# Patient Record
Sex: FEMALE | Race: BLACK OR AFRICAN AMERICAN | NOT HISPANIC OR LATINO | Employment: UNEMPLOYED | URBAN - METROPOLITAN AREA
[De-identification: names, ages, dates, MRNs, and addresses within clinical notes are randomized per-mention and may not be internally consistent; named-entity substitution may affect disease eponyms.]

---

## 2023-09-28 PROBLEM — I77.71 DISSECTION OF LEFT CAROTID ARTERY (MULTI): Status: ACTIVE | Noted: 2023-09-28

## 2023-09-28 PROBLEM — M67.88 ACHILLES TENDINOSIS: Status: ACTIVE | Noted: 2023-09-28

## 2023-09-28 PROBLEM — I63.9 CEREBROVASCULAR ACCIDENT (CVA) (MULTI): Status: ACTIVE | Noted: 2023-09-28

## 2023-09-28 PROBLEM — I10 HYPERTENSION: Status: ACTIVE | Noted: 2023-09-28

## 2023-09-28 PROBLEM — Z86.73 HISTORY OF ISCHEMIC LEFT MCA STROKE: Status: ACTIVE | Noted: 2023-09-28

## 2023-09-28 PROBLEM — I77.3 FIBROMUSCULAR DYSPLASIA OF CAROTID ARTERY (CMS-HCC): Status: ACTIVE | Noted: 2023-09-28

## 2023-09-28 PROBLEM — M23.90 INTERNAL DERANGEMENT OF KNEE: Status: ACTIVE | Noted: 2023-09-28

## 2023-09-28 PROBLEM — G56.03 BILATERAL CARPAL TUNNEL SYNDROME: Status: ACTIVE | Noted: 2023-09-28

## 2023-09-28 PROBLEM — G89.29 CHRONIC BACK PAIN: Status: ACTIVE | Noted: 2023-09-28

## 2023-09-28 PROBLEM — I69.359 HEMIPARESIS AFFECTING DOMINANT SIDE AS LATE EFFECT OF STROKE (MULTI): Status: ACTIVE | Noted: 2023-09-28

## 2023-09-28 PROBLEM — M54.9 CHRONIC BACK PAIN: Status: ACTIVE | Noted: 2023-09-28

## 2023-09-28 PROBLEM — R47.01 EXPRESSIVE APHASIA: Status: ACTIVE | Noted: 2023-09-28

## 2023-09-28 PROBLEM — I69.390: Status: ACTIVE | Noted: 2023-09-28

## 2023-09-28 PROBLEM — I69.320 APHASIA FOLLOWING CEREBRAL INFARCTION: Status: ACTIVE | Noted: 2023-09-28

## 2023-09-28 PROBLEM — I69.351 SPASTIC HEMIPLEGIA OF RIGHT DOMINANT SIDE AS LATE EFFECT OF CEREBRAL INFARCTION (MULTI): Status: ACTIVE | Noted: 2023-09-28

## 2023-09-28 PROBLEM — R26.9 NEUROLOGIC GAIT DISORDER: Status: ACTIVE | Noted: 2023-09-28

## 2023-09-28 PROBLEM — M35.9 CONNECTIVE TISSUE DISEASE, UNDIFFERENTIATED (MULTI): Status: ACTIVE | Noted: 2023-09-28

## 2023-09-28 PROBLEM — R00.2 PALPITATIONS: Status: ACTIVE | Noted: 2023-09-28

## 2023-09-28 PROBLEM — R56.9 SEIZURES (MULTI): Status: ACTIVE | Noted: 2023-09-28

## 2023-09-28 PROBLEM — M76.60 ACHILLES TENDINITIS: Status: ACTIVE | Noted: 2023-09-28

## 2023-09-28 PROBLEM — M92.60 HAGLUND'S DEFORMITY: Status: ACTIVE | Noted: 2023-09-28

## 2023-09-28 RX ORDER — DESVENLAFAXINE SUCCINATE 25 MG/1
TABLET, EXTENDED RELEASE ORAL
COMMUNITY
Start: 2021-01-14

## 2023-09-28 RX ORDER — OMEPRAZOLE 20 MG/1
1 TABLET, DELAYED RELEASE ORAL DAILY
COMMUNITY

## 2023-09-28 RX ORDER — LORATADINE 10 MG/1
TABLET ORAL
COMMUNITY

## 2023-09-28 RX ORDER — BACLOFEN 10 MG/1
TABLET ORAL
COMMUNITY
Start: 2021-07-26 | End: 2023-10-05 | Stop reason: SDUPTHER

## 2023-09-28 RX ORDER — FOLIC ACID 1 MG/1
TABLET ORAL
COMMUNITY
Start: 2020-09-24

## 2023-09-28 RX ORDER — TIZANIDINE 4 MG/1
TABLET ORAL
COMMUNITY
Start: 2021-08-05

## 2023-09-28 RX ORDER — HYDROXYCHLOROQUINE SULFATE 200 MG/1
TABLET, FILM COATED ORAL 2 TIMES DAILY
COMMUNITY
Start: 2021-06-09

## 2023-09-28 RX ORDER — ONDANSETRON 4 MG/1
TABLET, ORALLY DISINTEGRATING ORAL EVERY 6 HOURS
COMMUNITY
Start: 2023-02-04

## 2023-09-28 RX ORDER — AMLODIPINE BESYLATE 10 MG/1
1 TABLET ORAL DAILY
COMMUNITY
Start: 2017-09-05

## 2023-09-28 RX ORDER — VENLAFAXINE 50 MG/1
1 TABLET ORAL DAILY
COMMUNITY

## 2023-09-28 RX ORDER — GABAPENTIN 300 MG/1
1 CAPSULE ORAL 3 TIMES DAILY
COMMUNITY

## 2023-09-28 RX ORDER — LEVETIRACETAM 1000 MG/1
1 TABLET ORAL 2 TIMES DAILY
COMMUNITY
Start: 2022-09-05

## 2023-09-28 RX ORDER — ATORVASTATIN CALCIUM 40 MG/1
1 TABLET, FILM COATED ORAL NIGHTLY
COMMUNITY
Start: 2021-06-06

## 2023-09-28 RX ORDER — ONDANSETRON 4 MG/1
TABLET, FILM COATED ORAL EVERY 8 HOURS PRN
COMMUNITY

## 2023-09-28 RX ORDER — ALPRAZOLAM 1 MG/1
TABLET ORAL
COMMUNITY

## 2023-09-28 RX ORDER — RISPERIDONE 1 MG/1
TABLET ORAL
COMMUNITY

## 2023-09-28 RX ORDER — CLOPIDOGREL BISULFATE 75 MG/1
1 TABLET ORAL DAILY
COMMUNITY
Start: 2021-06-09

## 2023-09-28 RX ORDER — PANTOPRAZOLE SODIUM 20 MG/1
TABLET, DELAYED RELEASE ORAL
COMMUNITY
Start: 2021-08-05

## 2023-09-28 RX ORDER — FERROUS SULFATE 325(65) MG
TABLET ORAL 2 TIMES DAILY
COMMUNITY

## 2023-09-28 RX ORDER — LORATADINE 10 MG/1
CAPSULE, LIQUID FILLED ORAL
COMMUNITY
Start: 2021-08-05

## 2023-09-28 RX ORDER — ASPIRIN 81 MG/1
1 TABLET ORAL DAILY
COMMUNITY
Start: 2021-07-26

## 2023-09-28 RX ORDER — ACETAMINOPHEN 500 MG
TABLET ORAL
COMMUNITY
Start: 2020-10-27

## 2023-10-05 ENCOUNTER — OFFICE VISIT (OUTPATIENT)
Dept: NEUROLOGY | Facility: HOSPITAL | Age: 48
End: 2023-10-05
Payer: MEDICARE

## 2023-10-05 VITALS
SYSTOLIC BLOOD PRESSURE: 97 MMHG | HEIGHT: 62 IN | BODY MASS INDEX: 25.03 KG/M2 | DIASTOLIC BLOOD PRESSURE: 65 MMHG | HEART RATE: 74 BPM | WEIGHT: 136 LBS

## 2023-10-05 DIAGNOSIS — M62.838 MUSCLE SPASM: Primary | ICD-10-CM

## 2023-10-05 PROCEDURE — 99215 OFFICE O/P EST HI 40 MIN: CPT | Performed by: PSYCHIATRY & NEUROLOGY

## 2023-10-05 PROCEDURE — 3078F DIAST BP <80 MM HG: CPT | Performed by: PSYCHIATRY & NEUROLOGY

## 2023-10-05 PROCEDURE — 99205 OFFICE O/P NEW HI 60 MIN: CPT | Performed by: PSYCHIATRY & NEUROLOGY

## 2023-10-05 PROCEDURE — 3074F SYST BP LT 130 MM HG: CPT | Performed by: PSYCHIATRY & NEUROLOGY

## 2023-10-05 RX ORDER — BACLOFEN 10 MG/1
10 TABLET ORAL 3 TIMES DAILY
Qty: 90 TABLET | Refills: 3 | Status: SHIPPED | OUTPATIENT
Start: 2023-10-05 | End: 2024-02-27 | Stop reason: SDUPTHER

## 2023-10-05 RX ORDER — LOSARTAN POTASSIUM 50 MG/1
100 TABLET ORAL DAILY
COMMUNITY

## 2023-10-05 NOTE — PROGRESS NOTES
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 Knutson for botox evaluation.      Cause of spasticity: CVA   Cerebral spasticity: yes  Spinal spasticity: no  Mobility: four-point cane  Transfers: on own  Falls: no  AFO: yes     Spasticity interferes with residual function: yes, walking is affected by spasticity. No residual function in right hand  Spasticity interferes with ROM and posture: yes fingers and elbow.   Spasticity causes pain and/or discomfort: some painful cramps in the arm and leg.   Spasticity interferes with hygiene or skin care: no  UTIs and last urine check: no  temperature effect: yes cold makes her more stiff.      Tonic spasms:   Flexor:no  Extensor: no  Adductor: no  Isometric (cramps): yes arm and leg near daily, painful, no triggers.   Complex (more than 1 at a time): no  Painful: yes  Focal dystonia: right arm and finger dystonic flexion   Myoclonus: no  Spontaneous clonus: shaky right leg while showering.   Tremor: no  RLS: no     Treatment:  Baclofen: yes 10 mg once daily, no side effects. Never tried a higher dose.   Tizanidine: took in the past and was stopped by her doctors. No side effects.   Dantrolene: no  Gabapentin: took in the past  pregabalin: no  Tegretol: no  Trileptal: no  Anticholinergics: no  Dopamine agonist: no  Botulinum toxin: here to discuss.   Baclofen pump: no  Other treatments: none     PT: yes, helpful  OT: yes, helpful  Stretching: yes  Exercise: yes     Goals of spasticity management:   Function: walk better and get more movement in the arm   ROM/posture: yes to improve dystonic arm/finger flexion on the right.   Comfort: yes improve painful cramps in the arm and leg  Hygiene and skin care: no goals.         Spasticity exam:  Modified Taryn Scale Testing Form      Muscle Tested Score  right shoulder 2  right elbow 3  right wrist 4  right  fingers 03     left shoulder 0  left elbow 0  left wrist 0  left fingers 0     right hip 2  right knee 3  right ankle AFO     left hip 0  left knee 2 down from 0  left ankle 2 down from 0  ----------------------------     [place] Spasm Frequency Scale:  Score = 1     ----------------------------  Extensor spasms: no  Flexor spasms no  Adductor spasms: no  Isometric spasms: no  Complex spasms: no  Abnormal fixed posture or fixed dystonia: dystonic flexion of the right elbow, wrist, and fingers  Paroxysmal focal dystonia: no  Inducible clonus: no  Spontaneous clonus: not on exam  Myoclonus: no  Tremor: no  RLS/akathisia: no         Objective   Neurological Exam  Mental Status  Awake, alert and oriented to person, place and time. Expressive aphasia present. Attention and concentration are normal.    Cranial Nerves  CN II: Visual fields full to confrontation.  CN III, IV, VI: Extraocular movements intact bilaterally.  CN V: Facial sensation is normal.  CN VII: Full and symmetric facial movement.  CN VIII: Hearing is normal.  CN IX, X: Palate elevates symmetrically  CN XI:  Right: Sternocleidomastoid strength is weak. Trapezius strength is weak.  CN XII: Tongue midline without atrophy or fasciculations.    Motor   Increased muscle tone. Right hemiparesis.  Distal strength zero in RUE  RLE 4-/5.    Gait  Casual gait: Spastic gait.  Physical Exam  Eyes:      Extraocular Movements: Extraocular movements intact.       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 Main concern is improving ROM of the RUE and improve walking. She reports painful isometric spasms on the right side at nighttime. Exam positive for severe spasticity of the right hemibody with dystonic flexion of the right elbow, wrist, and fingers along with some extension of the right knee. No tonic spasms, clonus,  myoclonus, or tremor on exam. She has had no response to oral baclofen so far. She can be a good candidate for a higher dose of baclofen and targeted botulinum toxin injection to the following muscles on the right: biceps 50 U, FDS 25 U, FDP 25 U, FCU 25 U, FCR 25 U, FPB 25 U, MED GC 25, LAT CG 25, TP 25. The doses can be increased over time as tolerated. ITB not indicated due to maximum goals in the UE and lack of injectable trial.     PLAN:  I think you will be a good candidate for botox to improve the range of motion and posture of your right arm and ankle. Please keep in mind that botox does not make you stronger.     I will start the process to authorize botox for you. First step is to call Jory at [phone] #[phone] to schedule your injections directly. We are booking out a couple month in advance and she will do the prior authorization a month in advance and have the toxin ordered for that date.     In the meantime, please increase baclofen to 10 mg twice daily for two weeks then increase to one tablet three times daily after that.     The impression and plan were discussed with the patient and family (if present) in details and all questions answered.      Will share the note with the referring physician via EMR    Rex Burkett MD

## 2023-10-05 NOTE — PATIENT INSTRUCTIONS
I think you will be a good candidate for botox to improve the range of motion and posture of your right arm and ankle. Please keep in mind that botox does not make you stronger.     I will start the process to authorize botox for you. First step is to call Jory at [phone] #[phone] to schedule your injections directly. We are booking out a couple month in advance and she will do the prior authorization a month in advance and have the toxin ordered for that date.     In the meantime, please increase baclofen to 10 mg twice daily for two weeks then increase to one tablet three times daily after that.     Please continue daily stretching along with physical and occupational therapy.     Thank you visiting the clinic today    Rex Burkett MD

## 2023-10-17 ENCOUNTER — APPOINTMENT (OUTPATIENT)
Dept: CARDIOLOGY | Facility: HOSPITAL | Age: 48
End: 2023-10-17
Payer: COMMERCIAL

## 2023-10-26 ENCOUNTER — APPOINTMENT (OUTPATIENT)
Dept: RADIOLOGY | Facility: HOSPITAL | Age: 48
End: 2023-10-26
Payer: MEDICAID

## 2023-10-26 ENCOUNTER — HOSPITAL ENCOUNTER (OUTPATIENT)
Dept: RADIOLOGY | Facility: HOSPITAL | Age: 48
End: 2023-10-26
Payer: MEDICAID

## 2023-10-30 ENCOUNTER — APPOINTMENT (OUTPATIENT)
Dept: PHYSICAL THERAPY | Facility: CLINIC | Age: 48
End: 2023-10-30
Payer: MEDICAID

## 2023-10-30 ENCOUNTER — APPOINTMENT (OUTPATIENT)
Dept: SPEECH THERAPY | Facility: CLINIC | Age: 48
End: 2023-10-30
Payer: MEDICAID

## 2023-10-31 DIAGNOSIS — I15.8 OTHER SECONDARY HYPERTENSION: ICD-10-CM

## 2023-11-02 ENCOUNTER — LAB (OUTPATIENT)
Dept: LAB | Facility: LAB | Age: 48
End: 2023-11-02
Payer: MEDICAID

## 2023-11-02 DIAGNOSIS — I15.8 OTHER SECONDARY HYPERTENSION: ICD-10-CM

## 2023-11-02 PROCEDURE — 82565 ASSAY OF CREATININE: CPT

## 2023-11-02 PROCEDURE — 36415 COLL VENOUS BLD VENIPUNCTURE: CPT

## 2023-11-02 PROCEDURE — 84520 ASSAY OF UREA NITROGEN: CPT

## 2023-11-07 LAB
BUN SERPL-MCNC: 13 MG/DL (ref 6–23)
CREAT SERPL-MCNC: 0.7 MG/DL (ref 0.5–1.05)
GFR SERPL CREATININE-BSD FRML MDRD: >90 ML/MIN/1.73M*2

## 2023-11-16 ENCOUNTER — PROCEDURE VISIT (OUTPATIENT)
Dept: NEUROLOGY | Facility: CLINIC | Age: 48
End: 2023-11-16
Payer: MEDICARE

## 2023-11-16 VITALS — WEIGHT: 136 LBS | BODY MASS INDEX: 24.87 KG/M2

## 2023-11-16 DIAGNOSIS — I69.351 SPASTIC HEMIPLEGIA OF RIGHT DOMINANT SIDE AS LATE EFFECT OF CEREBRAL INFARCTION (MULTI): Primary | ICD-10-CM

## 2023-11-16 DIAGNOSIS — M62.838 MUSCLE SPASM: ICD-10-CM

## 2023-11-16 PROCEDURE — 64642 CHEMODENERV 1 EXTREMITY 1-4: CPT | Performed by: PSYCHIATRY & NEUROLOGY

## 2023-11-16 PROCEDURE — 95874 GUIDE NERV DESTR NEEDLE EMG: CPT | Performed by: PSYCHIATRY & NEUROLOGY

## 2023-11-16 PROCEDURE — 64643 CHEMODENERV 1 EXTREM 1-4 EA: CPT | Performed by: PSYCHIATRY & NEUROLOGY

## 2023-11-16 PROCEDURE — 99214 OFFICE O/P EST MOD 30 MIN: CPT | Performed by: PSYCHIATRY & NEUROLOGY

## 2023-11-16 NOTE — PROGRESS NOTES
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     Interval hx:  The increased dose of baclofen helped without side effects. Here for first botox injections. Had pain with injections.      Cause of spasticity: CVA   Cerebral spasticity: yes  Spinal spasticity: no  Mobility: four-point cane  Transfers: on own  Falls: no  AFO: yes     Spasticity interferes with residual function: yes, walking is affected by spasticity. No residual function in right hand  Spasticity interferes with ROM and posture: yes fingers and elbow.   Spasticity causes pain and/or discomfort: some painful cramps in the arm and leg.   Spasticity interferes with hygiene or skin care: no  UTIs and last urine check: no  temperature effect: yes cold makes her more stiff.      Tonic spasms:   Flexor:no  Extensor: no  Adductor: no  Isometric (cramps): yes arm and leg near daily, painful, no triggers.   Complex (more than 1 at a time): no  Painful: yes  Focal dystonia: right arm and finger dystonic flexion   Myoclonus: no  Spontaneous clonus: shaky right leg while showering.   Tremor: no  RLS: no     Treatment:  Baclofen: yes 10 mg TID helpful   Tizanidine: took in the past and was stopped by her doctors. No side effects.   Dantrolene: no  Gabapentin: took in the past  pregabalin: no  Tegretol: no  Trileptal: no  Anticholinergics: no  Dopamine agonist: no  Botulinum toxin: first injection 11/16/2023  Baclofen pump: no  Other treatments: none     PT: yes, helpful  OT: yes, helpful  Stretching: yes  Exercise: yes     Goals of spasticity management:   Function: walk better and get more movement in the arm   ROM/posture: yes to improve dystonic arm/finger flexion on the right.   Comfort: yes improve painful cramps in the arm and leg  Hygiene and skin care: no goals.         Spasticity exam:  Modified Taryn Scale Testing  Form      Muscle Tested Score  right shoulder 2  right elbow 3  right wrist 4  right fingers 03     left shoulder 0  left elbow 0  left wrist 0  left fingers 0     right hip 2  right knee 3  right ankle AFO     left hip 0  left knee 2 down from 0  left ankle 2 down from 0  ----------------------------     [place] Spasm Frequency Scale:  Score = 1     ----------------------------  Extensor spasms: no  Flexor spasms no  Adductor spasms: no  Isometric spasms: no  Complex spasms: no  Abnormal fixed posture or fixed dystonia: dystonic flexion of the right elbow, wrist, and fingers  Paroxysmal focal dystonia: no  Inducible clonus: no  Spontaneous clonus: not on exam  Myoclonus: no  Tremor: no  RLS/akathisia: no         Objective   Neurological Exam  Mental Status  Awake, alert and oriented to person, place and time. Expressive aphasia present. Attention and concentration are normal.    Cranial Nerves  CN II: Visual fields full to confrontation.  CN III, IV, VI: Extraocular movements intact bilaterally.  CN V: Facial sensation is normal.  CN VII: Full and symmetric facial movement.  CN VIII: Hearing is normal.  CN IX, X: Palate elevates symmetrically  CN XI:  Right: Sternocleidomastoid strength is weak. Trapezius strength is weak.  CN XII: Tongue midline without atrophy or fasciculations.    Motor   Increased muscle tone. Right hemiparesis.  Distal strength zero in RUE  RLE 4-/5.    Gait  Casual gait: Spastic gait.    Physical Exam  Eyes:      Extraocular Movements: Extraocular movements intact.        Procedure note:    Patient was injected with botox to the following muscles on the right:  biceps 50 U, FDS 25 U, FDP 25 U, FCU 25 U, FCR 25 U, FPB 25 U, MED GC 25, LAT CG 25, TP 25.   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 Main concern is improving ROM of the RUE and improve walking. She reports painful isometric spasms on the right side at nighttime. Exam positive for severe spasticity of the right hemibody with dystonic flexion of the right elbow, wrist, and fingers along with some extension of the right knee. No tonic spasms, clonus, myoclonus, or tremor on exam. She has had no response to oral baclofen so far. She can be a good candidate for a higher dose of baclofen and targeted botulinum toxin injection to the following muscles on the right: biceps 50 U, FDS 25 U, FDP 25 U, FCU 25 U, FCR 25 U, FPB 25 U, MED GC 25, LAT CG 25, TP 25. The doses can be increased over time as tolerated. ITB not indicated due to maximum goals in the UE and lack of injectable trial.     11/16/2023: The increased dose of baclofen helped without side effects. Here for first botox injections. Had pain with injections.     PLAN:  You were injected with a total of 250 units of botox. You tolerated the procedure well. The effect of botox usually kicks in after 3 to 5 days and lasts for three months. Please call office after about a week to report how you are doing. Follow up after three months for repeat injections.     Please continue the same dose of baclofen.     Please continue physical and occupational therapy.     Please continue daily stretching.     Thank you visiting the clinic today      Rex Burkett MD      The impression and plan were discussed with the patient and family (if present) in details and all questions answered.      Will share the note with the referring physician via EMR    Rex Burkett MD

## 2023-11-16 NOTE — PATIENT INSTRUCTIONS
You were injected with a total of 250 units of botox. You tolerated the procedure well. The effect of botox usually kicks in after 3 to 5 days and lasts for three months. Please call office after about a week to report how you are doing. Follow up after three months for repeat injections.     Please continue the same dose of baclofen.     Please continue physical and occupational therapy.     Please continue daily stretching.     Thank you visiting the clinic today      Rex Burkett MD

## 2023-11-24 ENCOUNTER — HOSPITAL ENCOUNTER (OUTPATIENT)
Dept: RADIOLOGY | Facility: HOSPITAL | Age: 48
Discharge: HOME | End: 2023-11-24
Payer: MEDICARE

## 2023-11-24 DIAGNOSIS — I77.71 DISSECTION OF CAROTID ARTERY (MULTI): ICD-10-CM

## 2023-11-24 DIAGNOSIS — I77.3 ARTERIAL FIBROMUSCULAR DYSPLASIA (CMS-HCC): ICD-10-CM

## 2023-11-24 DIAGNOSIS — M35.9 SYSTEMIC INVOLVEMENT OF CONNECTIVE TISSUE, UNSPECIFIED (MULTI): ICD-10-CM

## 2023-11-24 PROCEDURE — 74174 CTA ABD&PLVS W/CONTRAST: CPT

## 2023-11-24 PROCEDURE — 70498 CT ANGIOGRAPHY NECK: CPT | Performed by: RADIOLOGY

## 2023-11-24 PROCEDURE — 71275 CT ANGIOGRAPHY CHEST: CPT | Performed by: RADIOLOGY

## 2023-11-24 PROCEDURE — 70498 CT ANGIOGRAPHY NECK: CPT

## 2023-11-24 PROCEDURE — 74174 CTA ABD&PLVS W/CONTRAST: CPT | Performed by: RADIOLOGY

## 2023-11-24 PROCEDURE — 70496 CT ANGIOGRAPHY HEAD: CPT | Performed by: RADIOLOGY

## 2023-11-24 PROCEDURE — 2550000001 HC RX 255 CONTRASTS: Mod: SE | Performed by: INTERNAL MEDICINE

## 2023-11-24 RX ADMIN — IOHEXOL 100 ML: 350 INJECTION, SOLUTION INTRAVENOUS at 12:27

## 2023-12-14 ENCOUNTER — PATIENT MESSAGE (OUTPATIENT)
Dept: CARDIOLOGY | Facility: HOSPITAL | Age: 48
End: 2023-12-14

## 2024-02-27 ENCOUNTER — TELEPHONE (OUTPATIENT)
Dept: NEUROLOGY | Facility: HOSPITAL | Age: 49
End: 2024-02-27
Payer: MEDICARE

## 2024-02-27 DIAGNOSIS — M62.838 MUSCLE SPASM: Primary | ICD-10-CM

## 2024-02-27 RX ORDER — BACLOFEN 10 MG/1
10 TABLET ORAL 3 TIMES DAILY
Qty: 90 TABLET | Refills: 3 | Status: SHIPPED | OUTPATIENT
Start: 2024-02-27 | End: 2024-03-01 | Stop reason: SDUPTHER

## 2024-02-27 NOTE — TELEPHONE ENCOUNTER
Ann Marie Y Johnson called. She needs a refill of Baclofen 10 mg 1 tab TID. #90 with refills sent to [facility] on file.

## 2024-03-01 DIAGNOSIS — M62.838 MUSCLE SPASM: ICD-10-CM

## 2024-03-01 RX ORDER — BACLOFEN 10 MG/1
10 TABLET ORAL 3 TIMES DAILY
Qty: 90 TABLET | Refills: 3 | Status: SHIPPED | OUTPATIENT
Start: 2024-03-01 | End: 2024-04-11

## 2024-04-11 ENCOUNTER — PROCEDURE VISIT (OUTPATIENT)
Dept: NEUROLOGY | Facility: CLINIC | Age: 49
End: 2024-04-11
Payer: MEDICARE

## 2024-04-11 VITALS — WEIGHT: 143 LBS | BODY MASS INDEX: 26.16 KG/M2

## 2024-04-11 DIAGNOSIS — R25.2 SPASTICITY: Primary | ICD-10-CM

## 2024-04-11 DIAGNOSIS — M62.838 MUSCLE SPASM: ICD-10-CM

## 2024-04-11 PROCEDURE — 95874 GUIDE NERV DESTR NEEDLE EMG: CPT | Performed by: PSYCHIATRY & NEUROLOGY

## 2024-04-11 PROCEDURE — 64642 CHEMODENERV 1 EXTREMITY 1-4: CPT | Performed by: PSYCHIATRY & NEUROLOGY

## 2024-04-11 PROCEDURE — 64643 CHEMODENERV 1 EXTREM 1-4 EA: CPT | Performed by: PSYCHIATRY & NEUROLOGY

## 2024-04-11 PROCEDURE — 99214 OFFICE O/P EST MOD 30 MIN: CPT | Performed by: PSYCHIATRY & NEUROLOGY

## 2024-04-11 RX ORDER — BACLOFEN 20 MG/1
TABLET ORAL
Qty: 270 TABLET | Refills: 3 | Status: SHIPPED | OUTPATIENT
Start: 2024-04-11

## 2024-04-11 NOTE — PROGRESS NOTES
Neurology Botulinum Injection    Subjective   Ann Marie Bradshaw is an 48 y.o. female here for medical botulinum injection for Spasticity [R25.2]. ***    Objective   Neurological Exam  Physical Exam  Procedures  Assessment/Plan   ***  {Assess/PlanSmartLinks:91157}

## 2024-04-11 NOTE — PROGRESS NOTES
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      Interval hx:Botox helped but lasted only one month  Cause of spasticity: CVA   Cerebral spasticity: yes  Spinal spasticity: no  Mobility: four-point cane  Transfers: on own  Falls: no  AFO: yes     Spasticity interferes with residual function: yes, walking is affected by spasticity. No residual function in right hand  Spasticity interferes with ROM and posture: yes fingers and elbow.   Spasticity causes pain and/or discomfort: some painful cramps in the arm and leg.   Spasticity interferes with hygiene or skin care: no  UTIs and last urine check: no  temperature effect: yes cold makes her more stiff.      Tonic spasms:   Flexor:no  Extensor: no  Adductor: no  Isometric (cramps): yes arm and leg near daily, painful, no triggers.   Complex (more than 1 at a time): no  Painful: yes  Focal dystonia: right arm and finger dystonic flexion   Myoclonus: no  Spontaneous clonus: shaky right leg while showering.   Tremor: no  RLS: no     Treatment:  Baclofen: yes 10 mg TID helpful   Tizanidine: took in the past and was stopped by her doctors. No side effects.   Dantrolene: no  Gabapentin: took in the past  pregabalin: no  Tegretol: no  Trileptal: no  Anticholinergics: no  Dopamine agonist: no  Botulinum toxin: first injection 11/16/2023  Baclofen pump: no  Other treatments: none     PT: yes, helpful  OT: yes, helpful  Stretching: yes  Exercise: yes     Goals of spasticity management:   Function: walk better and get more movement in the arm   ROM/posture: yes to improve dystonic arm/finger flexion on the right.   Comfort: yes improve painful cramps in the arm and leg  Hygiene and skin care: no goals.            Spasticity exam:  Modified Taryn Scale Testing Form      Muscle Tested Score  right shoulder 2  right elbow 3  right wrist  4  right fingers 03     left shoulder 0  left elbow 0  left wrist 0  left fingers 0     right hip 2  right knee 3  right ankle AFO     left hip 0  left knee 2 down from 0  left ankle 2 down from 0  ----------------------------     [place] Spasm Frequency Scale:  Score = 1     ----------------------------  Extensor spasms: no  Flexor spasms no  Adductor spasms: no  Isometric spasms: no  Complex spasms: no  Abnormal fixed posture or fixed dystonia: dystonic flexion of the right elbow, wrist, and fingers  Paroxysmal focal dystonia: no  Inducible clonus: no  Spontaneous clonus: not on exam  Myoclonus: no  Tremor: no  RLS/akathisia: no               Objective   Neurological Exam  Mental Status  Awake, alert and oriented to person, place and time. Expressive aphasia present. Attention and concentration are normal.     Cranial Nerves  CN II: Visual fields full to confrontation.  CN III, IV, VI: Extraocular movements intact bilaterally.  CN V: Facial sensation is normal.  CN VII: Full and symmetric facial movement.  CN VIII: Hearing is normal.  CN IX, X: Palate elevates symmetrically  CN XI:  Right: Sternocleidomastoid strength is weak. Trapezius strength is weak.  CN XII: Tongue midline without atrophy or fasciculations.     Motor   Increased muscle tone. Right hemiparesis.  Distal strength zero in RUE  RLE 4-/5.     Gait  Casual gait: Spastic gait.     Physical Exam  Eyes:      Extraocular Movements: Extraocular movements intact.          Procedure note:     Patient was injected with botox to the following muscles on the right:  biceps 50 U, FDS 50 U, FDP 50 U, FCU 25 U, FCR 25 U, FPB 25 U, MED GC 25, LAT CG 25, TP 25.        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 Main concern is improving ROM of the RUE and improve walking. She  reports painful isometric spasms on the right side at nighttime. Exam positive for severe spasticity of the right hemibody with dystonic flexion of the right elbow, wrist, and fingers along with some extension of the right knee. No tonic spasms, clonus, myoclonus, or tremor on exam. She has had no response to oral baclofen so far. She can be a good candidate for a higher dose of baclofen and targeted botulinum toxin injection to the following muscles on the right: biceps 50 U, FDS 50 U, FDP 50 U, FCU 25 U, FCR 25 U, FPB 25 U, MED GC 25, LAT CG 25, TP 25.  ITB not indicated due to maximum goals in the UE and lack of injectable trial.      11/16/2023: The increased dose of baclofen helped without side effects. Here for first botox injections. Had pain with injections.     4/11/2024:Botox helped but lasted only one month.We  increased to 300 units today,We added 25 units to FDS and 25 units to FDP.     PLAN:  You were injected with a total of 300 units of botox. You tolerated the procedure well. The effect of botox usually kicks in after 3 to 5 days and lasts for three months. Please call office after about a week to report how you are doing. Follow up after three months for repeat injections.      Please continue the same dose of baclofen.      Please continue physical and occupational therapy.      Please continue daily stretching.      Thank you visiting the clinic today                The impression and plan were discussed with the patient and family (if present) in details and all questions answered.

## 2024-04-11 NOTE — PATIENT INSTRUCTIONS
You were injected with a total of 300 units of botox. You tolerated the procedure well. The effect of botox usually kicks in after 3 to 5 days and lasts for three months. Please call office after about a week to report how you are doing. Follow up after three months for repeat injections.      Please increase  baclofen to 20 mg three times a day.For now you have 10 mg at home take two tablets three times  day for the next refill I ordered baclofen 20 mg so take it one tabled three times a  day     Please continue physical and occupational therapy.      Please continue daily stretching.      Thank you visiting the clinic today

## 2024-06-14 ENCOUNTER — APPOINTMENT (OUTPATIENT)
Dept: NEUROLOGY | Facility: HOSPITAL | Age: 49
End: 2024-06-14
Payer: MEDICARE

## 2024-07-26 DIAGNOSIS — R56.9 SEIZURES (MULTI): Primary | ICD-10-CM

## 2024-07-29 RX ORDER — LEVETIRACETAM 1000 MG/1
1000 TABLET ORAL 2 TIMES DAILY
Qty: 180 TABLET | Refills: 0 | Status: SHIPPED | OUTPATIENT
Start: 2024-07-29 | End: 2024-10-27

## 2024-09-06 ENCOUNTER — OFFICE VISIT (OUTPATIENT)
Dept: NEUROLOGY | Facility: HOSPITAL | Age: 49
End: 2024-09-06
Payer: MEDICAID

## 2024-09-06 VITALS
DIASTOLIC BLOOD PRESSURE: 69 MMHG | HEART RATE: 69 BPM | HEIGHT: 62 IN | BODY MASS INDEX: 26.31 KG/M2 | WEIGHT: 143 LBS | RESPIRATION RATE: 17 BRPM | SYSTOLIC BLOOD PRESSURE: 103 MMHG

## 2024-09-06 DIAGNOSIS — I63.9 ARTERIAL ISCHEMIC STROKE (MULTI): Primary | ICD-10-CM

## 2024-09-06 DIAGNOSIS — R56.9 SEIZURES (MULTI): ICD-10-CM

## 2024-09-06 PROCEDURE — 3008F BODY MASS INDEX DOCD: CPT | Performed by: PSYCHIATRY & NEUROLOGY

## 2024-09-06 PROCEDURE — 3074F SYST BP LT 130 MM HG: CPT | Performed by: PSYCHIATRY & NEUROLOGY

## 2024-09-06 PROCEDURE — 3078F DIAST BP <80 MM HG: CPT | Performed by: PSYCHIATRY & NEUROLOGY

## 2024-09-06 PROCEDURE — 1036F TOBACCO NON-USER: CPT | Performed by: PSYCHIATRY & NEUROLOGY

## 2024-09-06 PROCEDURE — 99214 OFFICE O/P EST MOD 30 MIN: CPT | Performed by: PSYCHIATRY & NEUROLOGY

## 2024-09-06 PROCEDURE — 99214 OFFICE O/P EST MOD 30 MIN: CPT | Mod: GC | Performed by: PSYCHIATRY & NEUROLOGY

## 2024-09-06 RX ORDER — LEVETIRACETAM 1000 MG/1
1000 TABLET ORAL 2 TIMES DAILY
Qty: 180 TABLET | Refills: 3 | Status: SHIPPED | OUTPATIENT
Start: 2024-09-06 | End: 2025-09-06

## 2024-09-06 RX ORDER — HYDROCHLOROTHIAZIDE 25 MG/1
25 TABLET ORAL DAILY
COMMUNITY

## 2024-09-06 NOTE — PROGRESS NOTES
Assessment/Plan:  1. left carotid dissection leading to L MCA stroke; L carotid improved to 20% stenosis in Nov. 2021 and now dissection resolved but remains irregular.   - Continue aspirin and stop Plavix. Dissection could in part be due to fibromuscular dysplasia.    2. FMD; CTA obtained in in 9/2023 showed only that carotid changes that are suggestive of FMD, no other arteriopathy identified  - Follow-up with Dr. Parham recommended     2. Hemiparesis and aphasia: continue to work with therapy  - [facility]     3. seizures: Keppra renewed.     4. Wants to drive: Has driving evaluation in October, will decide based on that. No visual field deficit detected today but quite week in the RUE and RLE         HPI:   Ann Marie Bradshaw is a 49 y.o. woman with history of mixed connective tissue disorder/Sjogren's and Hx of L MCA stroke 2/2 L ICA dissection who presents for stroke follow-up.    She initally presented in 2021 when she was in her usual state of health until the end of May 2021 when she developed slurred speech, aphasia and right hemiparesis. She was initially seen at [facility] and later transferred to [facility]. Work up showed a large left middle cerebral artery infarction with left internal carotid artery dissection. She was discharged on asa and plavix to acute rehab and has since returned home with outpatient therapy. She has improved significantly and can do many ADLs independently but continues to have prominent aphasia.     Stroke workup:  MRI: large left middle cerebral artery infarction   MRA/MRI neck fat sat: left internal carotid artery dissection with extensive and severe narrowing of the vessel extending intracranially.  Echo 2017: Ejection fraction: 60-65%   Left Atrium: moderate to severe left atrial enlargement   LDL: 88  HbA1C: 5.5  BNP: 21  Hypercoaguable work up: AVIVA 1:40, beta 2 glycoprotein, anti-cardiolipin, antiphospholipid antibody  negative    She was last seen in May 2023 at which time she was referred to Dr. Parham for FMD evaluation. She ordered a full body CTA to look for any other evidence of arteriopathy but this did not show any abnormalities other than the carotid changes that seem consistent with FMD. Dr. Parham wanted to see her for follow-up but this was not done yet.    From a Stroke recovery standpoint she has been doing well with improving R sided weakness and improving aphasia. She remains spastic in the RUE and RLE and is seeing Dr. Burkett for spasticity, currently on Baclofen 20mg TID and Botox injections last injection April 2024.   Her stroke was also c/b Epilepsy which is well controlled on Keppra 1000mg BID, last seizure 12/2022.     She wishes to get clearance for driving as she is used to using her left leg to use the pedals even before her stroke due to a R ankle injury, and she has improved vision.    Current Outpatient Medications on File Prior to Visit   Medication Sig Dispense Refill    amLODIPine (Norvasc) 10 mg tablet Take 1 tablet (10 mg) by mouth once daily.      aspirin 81 mg EC tablet Take 1 tablet (81 mg) by mouth once daily.      baclofen (Lioresal) 20 mg tablet ONE TABLET THREE TIMES A   tablet 3    folic acid (Folvite) 1 mg tablet Take by mouth.      hydroCHLOROthiazide (HYDRODiuril) 25 mg tablet Take 1 tablet (25 mg) by mouth once daily.      hydroxychloroquine (Plaquenil) 200 mg tablet Take by mouth twice a day.      levETIRAcetam (Keppra) 1,000 mg tablet Take 1 tablet (1,000 mg) by mouth 2 times a day. 180 tablet 0    loratadine (Claritin) 10 mg tablet Take by mouth.      losartan (Cozaar) 50 mg tablet Take 2 tablets (100 mg) by mouth once daily.      omeprazole OTC (PriLOSEC OTC) 20 mg EC tablet Take 1 tablet (20 mg) by mouth once daily.      ondansetron (Zofran) 4 mg tablet Take by mouth every 8 hours if needed.      tiZANidine (Zanaflex) 4 mg tablet Take by mouth.      acetaminophen (Tylenol)  500 mg tablet Take by mouth.      ALPRAZolam (Xanax) 1 mg tablet Take by mouth.      atorvastatin (Lipitor) 40 mg tablet Take 1 tablet (40 mg) by mouth once daily at bedtime.      clopidogrel (Plavix) 75 mg tablet Take 1 tablet (75 mg) by mouth once daily.      desvenlafaxine succinate (Pristiq) 25 mg 24 hour tablet Take by mouth.      ferrous sulfate 325 (65 Fe) MG tablet Take by mouth twice a day.      gabapentin (Neurontin) 300 mg capsule Take 1 capsule (300 mg) by mouth 3 times a day.      loratadine (Claritin Liqui-Gel) 10 mg capsule Take by mouth.      ondansetron ODT (Zofran-ODT) 4 mg disintegrating tablet Take by mouth every 6 hours.      pantoprazole (ProtoNix) 20 mg EC tablet Take by mouth.      risperiDONE (RisperDAL) 1 mg tablet Take by mouth.      venlafaxine (Effexor) 50 mg tablet Take 1 tablet (50 mg) by mouth once daily.       Current Facility-Administered Medications on File Prior to Visit   Medication Dose Route Frequency Provider Last Rate Last Admin    onabotulinumtoxinA (Botox) injection 250 Units  250 Units intramuscular Once Rex Burkett MD        onabotulinumtoxinA (Botox) injection 300 Units  300 Units intramuscular Once Rex Burkett MD            Patient Active Problem List   Diagnosis    Neurologic gait disorder    Internal derangement of knee    Hypertension    History of ischemic left MCA stroke    Hemiparesis affecting dominant side as late effect of stroke (Multi)    Miladys's deformity    Fibromuscular dysplasia of carotid artery (CMS-HCC)    Expressive aphasia    Dissection of left carotid artery (Multi)    Chronic back pain    Cerebrovascular accident (CVA) (Multi)    Connective tissue disease, undifferentiated (Multi)    Bilateral carpal tunnel syndrome    Apraxia following cerebral infarction    Aphasia following cerebral infarction    Achilles tendinosis    Achilles tendinitis    Spastic hemiplegia of right dominant side as late effect of cerebral infarction (Multi)     Seizures (Multi)    Palpitations        Past Surgical History:   Procedure Laterality Date    CT ANGIO NECK  11/26/2021    CT NECK ANGIO W AND WO IV CONTRAST 11/26/2021 [facility]LL[facility]GAC[facility]    MR HEAD ANGIO WO IV CONTRAST  6/1/2021    MR HEAD ANGIO WO IV CONTRAST 6/1/2021 [facility] CLINICAL LEGACY    MR HEAD ANGIO WO IV CONTRAST  6/4/2021    MR HEAD ANGIO WO IV CONTRAST 6/4/2021 [facility] CLINICAL LEGACY    MR NECK ANGIO WO IV CONTRAST  6/1/2021    MR NECK ANGIO WO IV CONTRAST 6/1/2021 [facility] CLINICAL LEGACY    MR NECK ANGIO WO IV CONTRAST  6/4/2021    MR NECK ANGIO WO IV CONTRAST 6/4/2021 [facility] CLINICAL LEGACY    OTHER SURGICAL HISTORY  01/10/2020    No history of surgery        Allergies   Allergen Reactions    Codeine Other     chest pain    Prochlorperazine Other     spasm of jaw - unable to open mouth        No family history on file.     Social History     Tobacco Use    Smoking status: Never     Passive exposure: Never    Smokeless tobacco: Never   Substance Use Topics    Alcohol use: Never    Drug use: Never        ROS:    General: No symptoms reported  Vision: No symptoms reported  ENT: No symptoms reported  Pulm: No symptoms reported  Cardiac: No symptoms reported  GI: No symptoms reported  Urology: No symptoms reported  Rheum: No symptoms reported  Derm: No symptoms reported  Neuro: No symptoms reported  Psych: No symptoms reported  Endo: No symptoms reported  Hem-Onc: No symptoms reported     Objective:  Visit Vitals  /69   Pulse 69   Resp 17        Neurologic Exam:    Mentals status: A&Ox4, able to follow complex multistep commands, memory intact to events, has good insight into medical condition. Moderate expressive aphasia but understands multi step commands with very mild receptive component of aphasia     CRANIAL NERVES:  VF full, EOM intact in all directions with no nystagmus, symmetrical facial strength and sensation, tongue and palate with no deviation     MOTOR:   Marked spasticity with flexion of  fingers on the right and flexed elbow  LUE and LLE 5/5 throughout\  RUE 3/5 at shoulder, 1/5 elbow flex/ext, and 0/5 at the wrist  RLE 4-/5, but ankle flex/ext 0/5     COORDINATION: Finger to nose and heel to shin intact on the right     SENSORY: Intact and symmetric to light touch in BUEs and BLEs with no neglect    GAIT: Ambulated without assistance, antalgic gait with spastic RLE    Barthel Index:  Feeding: 10=independent  Dressing: 10=independent  Groomin=independent  Bathin=independent  Transfers: 15=independent  Mobility: 15=independent 50 yards  Stairs: 10=independent  Toileting: 10=independent  Bladder: 10=continent  Bowels: 10=continent    Total Score:     Modified Tabitha Score:  2=slight disability: unable to carry out all previous activities, but able to look after own affairs without assistance    NIHSS:  Level of Consciousness: 0=alert      LOC questions: 0=answers both questions      LOC commands: 0=performs both  Best Gaze: 0=normal  Visual: 0=normal  Facial Palsy: 0=normal  Motor:      Motor Arm (Left): 0=normal      Motor Arm (Right): 2=right drift to bed      Motor Leg (Left): 0=left leg normal      Motor Leg (Right): 1=right leg drift  Limb Ataxia: 0=absent  Sensory: 0=normal  Best Language: 1=mild-mod aphasia (can communicate ideas)  Dysarthria: 0=normal  Extinction and Inattention: 0=no abnormality     Total Score: 4    The chart was reviewed and summarized as above.  Neuroimaging was personally reviewed and interpreted as documented in the HPI or Assessment  and Plan.

## 2024-09-06 NOTE — PATIENT INSTRUCTIONS
You were seen for Stroke follow-up. You are doing well, you can stop the plavix and only take Aspirin for stroke prevention.   Continue taking your blood pressure medication to minimize the risk of dissection.   You need to make a follow-up appointment with Dr. Parham for fibromuscular dysplasia, please call [phone] to schedule this.   We renewed your Keppra and referred you for a rehab research study.    Please schedule follow-up with us in 1 year    Stroke prevention:  Eat a healthy diet with plenty of fresh fruits and vegetables.  Avoid salt and fried foods.  Lose weight and exercise on a regular basis.  Do not smoke or use drugs.  Be sure to take all medications.  Call 911 for signs of stroke (numbness or weakness on one side, trouble seeing on one side, trouble speaking (either because your speech is slurred or you can't find the words), sudden double vision, spinning sensation or loss of coordination).    To find a stroke support group: [email or web address]  To learn about Virtual Stroke Educations sessions: contact Mary Ann Pavon at: [email or web address]    I can be reached at phone: [phone] or email: [email or web address] or message me via [email or web address]

## 2024-09-11 DIAGNOSIS — I77.71 DISSECTION OF LEFT CAROTID ARTERY (MULTI): ICD-10-CM

## 2024-09-12 ENCOUNTER — APPOINTMENT (OUTPATIENT)
Dept: NEUROLOGY | Facility: CLINIC | Age: 49
End: 2024-09-12
Payer: MEDICARE

## 2024-09-12 VITALS — BODY MASS INDEX: 26.34 KG/M2 | WEIGHT: 144 LBS

## 2024-09-12 DIAGNOSIS — I69.351 HEMIPLEGIA AND HEMIPARESIS FOLLOWING CEREBRAL INFARCTION AFFECTING RIGHT DOMINANT SIDE (MULTI): ICD-10-CM

## 2024-09-12 DIAGNOSIS — R25.2 SPASTICITY: Primary | ICD-10-CM

## 2024-09-12 RX ORDER — BACLOFEN 20 MG/1
TABLET ORAL
Qty: 270 TABLET | Refills: 3 | Status: SHIPPED | OUTPATIENT
Start: 2024-09-12

## 2024-09-12 NOTE — PROGRESS NOTES
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      Interval hx: Botox helped and would like to do the same.      Cause of spasticity: CVA   Cerebral spasticity: yes  Spinal spasticity: no  Mobility: four-point cane  Transfers: on own  Falls: no  AFO: yes     Spasticity interferes with residual function: yes, walking is affected by spasticity. No residual function in right hand  Spasticity interferes with ROM and posture: yes fingers and elbow.   Spasticity causes pain and/or discomfort: some painful cramps in the arm and leg.   Spasticity interferes with hygiene or skin care: no  UTIs and last urine check: no  temperature effect: yes cold makes her more stiff.      Tonic spasms:   Flexor:no  Extensor: no  Adductor: no  Isometric (cramps): yes arm and leg near daily, painful, no triggers.   Complex (more than 1 at a time): no  Painful: yes  Focal dystonia: right arm and finger dystonic flexion   Myoclonus: no  Spontaneous clonus: shaky right leg while showering.   Tremor: no  RLS: no     Treatment:  Baclofen: yes 10 mg TID helpful   Tizanidine: took in the past and was stopped by her doctors. No side effects.   Dantrolene: no  Gabapentin: took in the past  pregabalin: no  Tegretol: no  Trileptal: no  Anticholinergics: no  Dopamine agonist: no  Botulinum toxin: first injection 11/16/2023  Baclofen pump: no  Other treatments: none     PT: yes, helpful  OT: yes, helpful  Stretching: yes  Exercise: yes     Goals of spasticity management:   Function: walk better and get more movement in the arm   ROM/posture: yes to improve dystonic arm/finger flexion on the right.   Comfort: yes improve painful cramps in the arm and leg  Hygiene and skin care: no goals.            Spasticity exam:  Modified Taryn Scale Testing Form      Muscle Tested Score  right shoulder 2  right elbow 3  right  wrist 4  right fingers 03     left shoulder 0  left elbow 0  left wrist 0  left fingers 0     right hip 2  right knee 3  right ankle AFO     left hip 0  left knee 2 down from 0  left ankle 2 down from 0  ----------------------------     Cole Spasm Frequency Scale:  Score = 1     ----------------------------  Extensor spasms: no  Flexor spasms no  Adductor spasms: no  Isometric spasms: no  Complex spasms: no  Abnormal fixed posture or fixed dystonia: dystonic flexion of the right elbow, wrist, and fingers  Paroxysmal focal dystonia: no  Inducible clonus: no  Spontaneous clonus: not on exam  Myoclonus: no  Tremor: no  RLS/akathisia: no               Objective   Neurological Exam  Mental Status  Awake, alert and oriented to person, place and time. Expressive aphasia present. Attention and concentration are normal.     Cranial Nerves  CN II: Visual fields full to confrontation.  CN III, IV, VI: Extraocular movements intact bilaterally.  CN V: Facial sensation is normal.  CN VII: Full and symmetric facial movement.  CN VIII: Hearing is normal.  CN IX, X: Palate elevates symmetrically  CN XI:  Right: Sternocleidomastoid strength is weak. Trapezius strength is weak.  CN XII: Tongue midline without atrophy or fasciculations.     Motor   Increased muscle tone. Right hemiparesis.  Distal strength zero in RUE  RLE 4-/5.     Gait  Casual gait: Spastic gait.     Physical Exam  Eyes:      Extraocular Movements: Extraocular movements intact.          Procedure note:     Patient was injected with botox to the following muscles on the right:  biceps 50 U, FDS  50 U, FDP 50 U, FCU 25 U, FCR 25 U, FPB 25 U, MED GC 25, LAT CG 25, TP 25.       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 Main concern is improving ROM of the RUE and improve walking. She reports painful isometric spasms  on the right side at nighttime. Exam positive for severe spasticity of the right hemibody with dystonic flexion of the right elbow, wrist, and fingers along with some extension of the right knee. No tonic spasms, clonus, myoclonus, or tremor on exam. She has had no response to oral baclofen so far. She can be a good candidate for a higher dose of baclofen and targeted botulinum toxin injection to the following muscles on the right: biceps 50 U, FDS 50 U, FDP 50 U, FCU 25 U, FCR 25 U, FPB 25 U, MED GC 25, LAT CG 25, TP 25.  ITB not indicated due to maximum goals in the UE and lack of injectable trial.      11/16/2023: The increased dose of baclofen helped without side effects. Here for first botox injections. Had pain with injections.     4/11/2024:Botox helped but lasted only one month.We  increased to 300 units today,We added 25 units to FDS and 25 units to FDP.    9/12/2024: Botox helped and would like to do the same.    PLAN:  You were injected with a total of 300 units of botox. You tolerated the procedure well. The effect of botox usually kicks in after 3 to 5 days and lasts for three months. Please call office after about a week to report how you are doing. Follow up after three months for repeat injections.      Please continue the same dose of baclofen.      Please continue physical and occupational therapy.      Please continue daily stretching.      Thank you visiting the clinic today        The impression and plan were discussed with the patient and family (if present) in details and all questions answered.      Neurology Botulinum Injection

## 2024-09-12 NOTE — PATIENT INSTRUCTIONS
You were injected with a total of 300 units of botox. You tolerated the procedure well. The effect of botox usually kicks in after 3 to 5 days and lasts for three months. Please call office after about a week to report how you are doing. Follow up after three months for repeat injections.      Please continue the same dose of baclofen. Refill provided.     Please continue physical and occupational therapy.      Please continue daily stretching.      Thank you visiting the clinic today

## 2024-09-17 ENCOUNTER — OFFICE VISIT (OUTPATIENT)
Dept: CARDIOLOGY | Facility: HOSPITAL | Age: 49
End: 2024-09-17
Payer: COMMERCIAL

## 2024-09-17 ENCOUNTER — HOSPITAL ENCOUNTER (OUTPATIENT)
Dept: VASCULAR MEDICINE | Facility: HOSPITAL | Age: 49
Discharge: HOME | End: 2024-09-17
Payer: COMMERCIAL

## 2024-09-17 VITALS
WEIGHT: 145.5 LBS | HEIGHT: 62 IN | OXYGEN SATURATION: 100 % | HEART RATE: 68 BPM | BODY MASS INDEX: 26.78 KG/M2 | SYSTOLIC BLOOD PRESSURE: 102 MMHG | DIASTOLIC BLOOD PRESSURE: 69 MMHG

## 2024-09-17 DIAGNOSIS — R47.01 EXPRESSIVE APHASIA: Primary | ICD-10-CM

## 2024-09-17 DIAGNOSIS — I77.71 BILATERAL CAROTID ARTERY DISSECTION (MULTI): ICD-10-CM

## 2024-09-17 DIAGNOSIS — I77.71 DISSECTION OF LEFT CAROTID ARTERY (MULTI): ICD-10-CM

## 2024-09-17 DIAGNOSIS — Z86.73 HISTORY OF ISCHEMIC LEFT MCA STROKE: ICD-10-CM

## 2024-09-17 PROCEDURE — 3008F BODY MASS INDEX DOCD: CPT | Performed by: INTERNAL MEDICINE

## 2024-09-17 PROCEDURE — 1036F TOBACCO NON-USER: CPT | Performed by: INTERNAL MEDICINE

## 2024-09-17 PROCEDURE — 93880 EXTRACRANIAL BILAT STUDY: CPT | Performed by: SURGERY

## 2024-09-17 PROCEDURE — 93880 EXTRACRANIAL BILAT STUDY: CPT

## 2024-09-17 PROCEDURE — 99214 OFFICE O/P EST MOD 30 MIN: CPT | Performed by: INTERNAL MEDICINE

## 2024-09-17 PROCEDURE — 3078F DIAST BP <80 MM HG: CPT | Performed by: INTERNAL MEDICINE

## 2024-09-17 PROCEDURE — 3074F SYST BP LT 130 MM HG: CPT | Performed by: INTERNAL MEDICINE

## 2024-09-17 NOTE — PATIENT INSTRUCTIONS
Continue medications as prescribed.  Referral placed for medical genetics.    See you back in July with CTA head and neck.    Anushka Parham MD  Co-Director, Vascular Center  [facility], [facility]   [facility] Master Clinician in Fibromuscular Dysplasia and Vascular Care  Professor of Medicine  [facility]

## 2024-09-17 NOTE — PROGRESS NOTES
"09/17/24  10:04 AM    Vascular Medicine - FMD/Arterial Dissection Program     is a 49 y.o. woman seen in follow up of major left MCA territory ischemic stroke 6.2021 due to left ICA dissection with stigmata of right ICA dissection and has a left V2/V3 PSA irregularity, possible c/w prior dissection there. She does not have classical FMD; no lesions below neck, ICA ? Beading vs. Remodeling at sites of prior dissection.  She has ICA tortuosity. She has uncontrolled HTN in the past, better now.    Since I saw her in 9.2023, she has made progress with speech and right sided weakness. Spasticity also better    Dr. Lee stopped Plavix earlier this month; now on aspirin only.    BP good.    Headaches minimal, but she had an ER visit 7.11.2024 at [facility] with a bad headache. Repeat CTA with stable description of findings.    Family hx: 2 daughters, 1 is a nursing student (younger).  Her brother had a fatal \"neck artery rupture\" of uncertain circumstances (he was intubated for another reason at the time).    She is accompanied by a health aide today.    Past Medical History:   Diagnosis Date    Pain in right wrist 02/12/2018    Right wrist pain    Personal history of other diseases of the female genital tract 07/09/2021    History of vaginal discharge    Personal history of other diseases of the musculoskeletal system and connective tissue 02/12/2018    History of fibromyalgia     Patient Active Problem List   Diagnosis    Neurologic gait disorder    Internal derangement of knee    Hypertension    History of ischemic left MCA stroke    Hemiparesis affecting dominant side as late effect of stroke (Multi)    Miladys's deformity    Fibromuscular dysplasia of carotid artery (CMS-HCC)    Expressive aphasia    Dissection of left carotid artery (Multi)    Chronic back pain    Cerebrovascular accident (CVA) (Multi)    Connective tissue disease, undifferentiated (Multi)    Bilateral carpal tunnel syndrome    Apraxia " following cerebral infarction    Aphasia following cerebral infarction    Achilles tendinosis    Achilles tendinitis    Spastic hemiplegia of right dominant side as late effect of cerebral infarction (Multi)    Seizures (Multi)    Palpitations     Current Outpatient Medications   Medication Sig Dispense Refill    ALPRAZolam (Xanax) 1 mg tablet Take by mouth.      amLODIPine (Norvasc) 10 mg tablet Take 1 tablet (10 mg) by mouth once daily.      aspirin 81 mg EC tablet Take 1 tablet (81 mg) by mouth once daily.      atorvastatin (Lipitor) 40 mg tablet Take 1 tablet (40 mg) by mouth once daily at bedtime.      baclofen (Lioresal) 20 mg tablet ONE TABLET THREE TIMES A   tablet 3    desvenlafaxine succinate (Pristiq) 25 mg 24 hour tablet Take by mouth.      ferrous sulfate 325 (65 Fe) MG tablet Take by mouth twice a day.      folic acid (Folvite) 1 mg tablet Take by mouth.      gabapentin (Neurontin) 300 mg capsule Take 1 capsule (300 mg) by mouth 3 times a day.      hydroCHLOROthiazide (HYDRODiuril) 25 mg tablet Take 1 tablet (25 mg) by mouth once daily.      hydroxychloroquine (Plaquenil) 200 mg tablet Take by mouth twice a day.      levETIRAcetam (Keppra) 1,000 mg tablet Take 1 tablet (1,000 mg) by mouth 2 times a day. 180 tablet 3    loratadine (Claritin) 10 mg tablet Take by mouth.      losartan (Cozaar) 50 mg tablet Take 2 tablets (100 mg) by mouth once daily.      omeprazole OTC (PriLOSEC OTC) 20 mg EC tablet Take 1 tablet (20 mg) by mouth once daily.      ondansetron (Zofran) 4 mg tablet Take by mouth every 8 hours if needed.      ondansetron ODT (Zofran-ODT) 4 mg disintegrating tablet Take by mouth every 6 hours.      pantoprazole (ProtoNix) 20 mg EC tablet Take by mouth.      risperiDONE (RisperDAL) 1 mg tablet Take by mouth.      tiZANidine (Zanaflex) 4 mg tablet Take by mouth.      venlafaxine (Effexor) 50 mg tablet Take 1 tablet (50 mg) by mouth once daily.      acetaminophen (Tylenol) 500 mg tablet  "Take by mouth.       Current Facility-Administered Medications   Medication Dose Route Frequency Provider Last Rate Last Admin    onabotulinumtoxinA (Botox) injection 250 Units  250 Units intramuscular Once Rex Burkett MD        onabotulinumtoxinA (Botox) injection 300 Units  300 Units intramuscular Once Rex Burkett MD         On examination:  Patient Vitals for the past 24 hrs:   BP Pulse SpO2 Height Weight   09/17/24 0932 102/69 68 100 % 1.575 m (5' 2\") 66 kg (145 lb 8 oz)   HEENT no classical Madai's  No cervical bruits  Marked right sided weakness and spasticity of right arm but can raise upper arm  Clear lungs  +S1, S2, RRR; no m/r/g  Abd benign, no bruits  Right leg weak  Speech a little more fluid in terms of her expressive aphasia    7.2024 CTA head/neck  IMPRESSION:     No acute intracranial pathology.     No stenosis or occlusion in the head or neck.     Chronic appearing cervical ICA aneurysms and pseudoaneurysm and mild   irregularity of the left V3 segment as described above, suggestive of a   connective tissue disorder.     Arterial blood flow was measured to detect acute large vessel occlusion   by computer aided detection software: Not Performed.   Concordance between software and imaging review: Not Applicable.     11.2023 CTA c/a/p  NO aortic or visceral branch aneurysms,  No FMD    7.2024 Labs  Cr 0.77  K 3.4  Na 138  HgB 12.5,     Assessment/Plan: 49 y.o. woman with large left hemispheric stroke in 6.2021 in context of hx of HTN and Sjogren's/MCTD. She has arteriopathy with stigmata of dissections in left ICA (acute), chronic changes right ICA, left vert. Her ICAs are tortuous. Her brother had an artery in his neck \"rupture\" with uncertain circumstances. She is doing well overall; recent ER visit but no new findings on CTA. She has made some progress with neurological recover since I saw her last. BP control critical; it is good today. Continue SAPT with aspirin. As a I don't " see classical FMD and any beads only in dissected areas, I'd like her to see medical genetics for arteriopathy panel.    RTC 7.2025 with CTA head/neck or sooner PRN.    Anushka Parham MD

## 2024-09-17 NOTE — LETTER
"September 17, 2024     Simeon Betancourt MD  [address]  [facility] [address]    Patient: Ann Marie Bradshaw   YOB: 1975   Date of Visit: 9/17/2024       Dear Dr. Simeon Betancourt MD:    Thank you for referring Ann Marie Bradshaw to me for evaluation. Below are my notes for this consultation.  If you have questions, please do not hesitate to call me. I look forward to following your patient along with you.       Sincerely,     Anushka Parham MD      CC: No Recipients  ______________________________________________________________________________________    09/17/24  10:04 AM    Vascular Medicine - FMD/Arterial Dissection Program     is a 49 y.o. woman seen in follow up of major left MCA territory ischemic stroke 6.2021 due to left ICA dissection with stigmata of right ICA dissection and has a left V2/V3 PSA irregularity, possible c/w prior dissection there. She does not have classical FMD; no lesions below neck, ICA ? Beading vs. Remodeling at sites of prior dissection.  She has ICA tortuosity. She has uncontrolled HTN in the past, better now.    Since I saw her in 9.2023, she has made progress with speech and right sided weakness. Spasticity also better    Dr. Lee stopped Plavix earlier this month; now on aspirin only.    BP good.    Headaches minimal, but she had an ER visit 7.11.2024 at [facility] with a bad headache. Repeat CTA with stable description of findings.    Family hx: 2 daughters, 1 is a nursing student (younger).  Her brother had a fatal \"neck artery rupture\" of uncertain circumstances (he was intubated for another reason at the time).    She is accompanied by a health aide today.    Past Medical History:   Diagnosis Date   • Pain in right wrist 02/12/2018    Right wrist pain   • Personal history of other diseases of the female genital tract 07/09/2021    History of vaginal discharge   • Personal history of other diseases of " the musculoskeletal system and connective tissue 02/12/2018    History of fibromyalgia     Patient Active Problem List   Diagnosis   • Neurologic gait disorder   • Internal derangement of knee   • Hypertension   • History of ischemic left MCA stroke   • Hemiparesis affecting dominant side as late effect of stroke (Multi)   • Miladys's deformity   • Fibromuscular dysplasia of carotid artery (CMS-HCC)   • Expressive aphasia   • Dissection of left carotid artery (Multi)   • Chronic back pain   • Cerebrovascular accident (CVA) (Multi)   • Connective tissue disease, undifferentiated (Multi)   • Bilateral carpal tunnel syndrome   • Apraxia following cerebral infarction   • Aphasia following cerebral infarction   • Achilles tendinosis   • Achilles tendinitis   • Spastic hemiplegia of right dominant side as late effect of cerebral infarction (Multi)   • Seizures (Multi)   • Palpitations     Current Outpatient Medications   Medication Sig Dispense Refill   • ALPRAZolam (Xanax) 1 mg tablet Take by mouth.     • amLODIPine (Norvasc) 10 mg tablet Take 1 tablet (10 mg) by mouth once daily.     • aspirin 81 mg EC tablet Take 1 tablet (81 mg) by mouth once daily.     • atorvastatin (Lipitor) 40 mg tablet Take 1 tablet (40 mg) by mouth once daily at bedtime.     • baclofen (Lioresal) 20 mg tablet ONE TABLET THREE TIMES A   tablet 3   • desvenlafaxine succinate (Pristiq) 25 mg 24 hour tablet Take by mouth.     • ferrous sulfate 325 (65 Fe) MG tablet Take by mouth twice a day.     • folic acid (Folvite) 1 mg tablet Take by mouth.     • gabapentin (Neurontin) 300 mg capsule Take 1 capsule (300 mg) by mouth 3 times a day.     • hydroCHLOROthiazide (HYDRODiuril) 25 mg tablet Take 1 tablet (25 mg) by mouth once daily.     • hydroxychloroquine (Plaquenil) 200 mg tablet Take by mouth twice a day.     • levETIRAcetam (Keppra) 1,000 mg tablet Take 1 tablet (1,000 mg) by mouth 2 times a day. 180 tablet 3   • loratadine (Claritin) 10 mg  "tablet Take by mouth.     • losartan (Cozaar) 50 mg tablet Take 2 tablets (100 mg) by mouth once daily.     • omeprazole OTC (PriLOSEC OTC) 20 mg EC tablet Take 1 tablet (20 mg) by mouth once daily.     • ondansetron (Zofran) 4 mg tablet Take by mouth every 8 hours if needed.     • ondansetron ODT (Zofran-ODT) 4 mg disintegrating tablet Take by mouth every 6 hours.     • pantoprazole (ProtoNix) 20 mg EC tablet Take by mouth.     • risperiDONE (RisperDAL) 1 mg tablet Take by mouth.     • tiZANidine (Zanaflex) 4 mg tablet Take by mouth.     • venlafaxine (Effexor) 50 mg tablet Take 1 tablet (50 mg) by mouth once daily.     • acetaminophen (Tylenol) 500 mg tablet Take by mouth.       Current Facility-Administered Medications   Medication Dose Route Frequency Provider Last Rate Last Admin   • onabotulinumtoxinA (Botox) injection 250 Units  250 Units intramuscular Once Rex Burkett MD       • onabotulinumtoxinA (Botox) injection 300 Units  300 Units intramuscular Once Rex Burkett MD         On examination:  Patient Vitals for the past 24 hrs:   BP Pulse SpO2 Height Weight   09/17/24 0932 102/69 68 100 % 1.575 m (5' 2\") 66 kg (145 lb 8 oz)   HEENT no classical Madai's  No cervical bruits  Marked right sided weakness and spasticity of right arm but can raise upper arm  Clear lungs  +S1, S2, RRR; no m/r/g  Abd benign, no bruits  Right leg weak  Speech a little more fluid in terms of her expressive aphasia    7.2024 CTA head/neck  IMPRESSION:     No acute intracranial pathology.     No stenosis or occlusion in the head or neck.     Chronic appearing cervical ICA aneurysms and pseudoaneurysm and mild   irregularity of the left V3 segment as described above, suggestive of a   connective tissue disorder.     Arterial blood flow was measured to detect acute large vessel occlusion   by computer aided detection software: Not Performed.   Concordance between software and imaging review: Not Applicable.     11.2023 CTA " "c/a/p  NO aortic or visceral branch aneurysms,  No FMD    7.2024 Labs  Cr 0.77  K 3.4  Na 138  HgB 12.5,     Assessment/Plan: 49 y.o. woman with large left hemispheric stroke in 6.2021 in context of hx of HTN and Sjogren's/MCTD. She has arteriopathy with stigmata of dissections in left ICA (acute), chronic changes right ICA, left vert. Her ICAs are tortuous. Her brother had an artery in his neck \"rupture\" with uncertain circumstances. She is doing well overall; recent ER visit but no new findings on CTA. She has made some progress with neurological recover since I saw her last. BP control critical; it is good today. Continue SAPT with aspirin. As a I don't see classical FMD and any beads only in dissected areas, I'd like her to see medical genetics for arteriopathy panel.    RTC 7.2025 with CTA head/neck or sooner PRN.    Anushka Parham MD          "

## 2025-01-23 ENCOUNTER — APPOINTMENT (OUTPATIENT)
Dept: NEUROLOGY | Facility: CLINIC | Age: 50
End: 2025-01-23
Payer: MEDICARE

## 2025-01-23 ENCOUNTER — TELEPHONE (OUTPATIENT)
Dept: NEUROLOGY | Facility: CLINIC | Age: 50
End: 2025-01-23

## 2025-01-23 VITALS — WEIGHT: 144 LBS | BODY MASS INDEX: 26.34 KG/M2

## 2025-01-23 DIAGNOSIS — R25.2 SPASTICITY: ICD-10-CM

## 2025-01-23 DIAGNOSIS — I69.351 HEMIPLEGIA AND HEMIPARESIS FOLLOWING CEREBRAL INFARCTION AFFECTING RIGHT DOMINANT SIDE (MULTI): Primary | ICD-10-CM

## 2025-01-23 RX ORDER — BACLOFEN 20 MG/1
TABLET ORAL
Qty: 270 TABLET | Refills: 3 | Status: SHIPPED | OUTPATIENT
Start: 2025-01-23

## 2025-01-23 NOTE — TELEPHONE ENCOUNTER
----- Message from Rex Burkett sent at 1/23/2025  1:31 PM EST -----  Please make sure she's approved for 400 units next time. Thanks.     Rex

## 2025-01-23 NOTE — PATIENT INSTRUCTIONS
You were injected with a total of 400 units of botox. You tolerated the procedure well. The effect of botox usually kicks in after 3 to 5 days and lasts for three months. Please call office after about a week to report how you are doing. Follow up after three months for repeat injections.      Please continue the same dose of baclofen. Refill provided.     Please continue physical and occupational therapy.      Please continue daily stretching.      Thank you visiting the clinic today

## 2025-01-23 NOTE — PROGRESS NOTES
Spasticity:     This is a 49 yo originally right handed AAF with hx of HTN and LICA dissection in 2021 resulting in aphasia and right hemiparesis who is here for spasticity follow up.      Interval hx: Botox helped but wore off after 2 weeks compared to previously when it lasted for 1 month. Denies SE. Would like to try higher dose.    Cause of spasticity: CVA   Cerebral spasticity: yes  Spinal spasticity: no  Mobility: four-point cane  Transfers: on own  Falls: no  AFO: yes     Spasticity interferes with residual function: yes, walking is affected by spasticity. No residual function in right hand  Spasticity interferes with ROM and posture: yes fingers and elbow.   Spasticity causes pain and/or discomfort: some painful cramps in the arm and leg.   Spasticity interferes with hygiene or skin care: no  UTIs and last urine check: no  temperature effect: yes cold makes her more stiff.      Tonic spasms:   Flexor:no  Extensor: no  Adductor: no  Isometric (cramps): yes arm and leg near daily, painful, no triggers.   Complex (more than 1 at a time): no  Painful: yes  Focal dystonia: right arm and finger dystonic flexion   Myoclonus: no  Spontaneous clonus: shaky right leg while showering.   Tremor: no  RLS: no     Treatment:  Baclofen: yes 10 mg TID helpful   Tizanidine: took in the past and was stopped by her doctors. No side effects.   Dantrolene: no  Gabapentin: took in the past  pregabalin: no  Tegretol: no  Trileptal: no  Anticholinergics: no  Dopamine agonist: no  Botulinum toxin: first injection 11/16/2023  Baclofen pump: no  Other treatments: none     PT: yes, helpful  OT: yes, helpful  Stretching: yes  Exercise: yes     Goals of spasticity management:   Function: walk better and get more movement in the arm   ROM/posture: yes to improve dystonic arm/finger flexion on the right.   Comfort: yes improve painful cramps in the arm and leg  Hygiene and skin care: no goals.            Spasticity exam:  Modified Taryn  Scale Testing Form      Muscle Tested Score  right shoulder 2  right elbow 3  right wrist 4  right fingers 03     left shoulder 0  left elbow 0  left wrist 0  left fingers 0     right hip 2  right knee 3  right ankle AFO     left hip 0  left knee 2 down from 0  left ankle 2 down from 0  ----------------------------     Cole Spasm Frequency Scale:  Score = 1     ----------------------------  Extensor spasms: no  Flexor spasms no  Adductor spasms: no  Isometric spasms: no  Complex spasms: no  Abnormal fixed posture or fixed dystonia: dystonic flexion of the right elbow, wrist, and fingers  Paroxysmal focal dystonia: no  Inducible clonus: no  Spontaneous clonus: not on exam  Myoclonus: no  Tremor: no  RLS/akathisia: no               Objective   Neurological Exam  Mental Status  Awake, alert and oriented to person, place and time. Expressive aphasia present. Attention and concentration are normal.     Cranial Nerves  CN II: Visual fields full to confrontation.  CN III, IV, VI: Extraocular movements intact bilaterally.  CN V: Facial sensation is normal.  CN VII: Full and symmetric facial movement.  CN VIII: Hearing is normal.  CN IX, X: Palate elevates symmetrically  CN XI:  Right: Sternocleidomastoid strength is weak. Trapezius strength is weak.  CN XII: Tongue midline without atrophy or fasciculations.     Motor   Increased muscle tone. Right hemiparesis.  Distal strength zero in RUE  RLE 4-/5.     Gait  Casual gait: Spastic gait.     Physical Exam  Eyes:      Extraocular Movements: Extraocular movements intact.          Procedure note:     Patient was injected with botox to the following muscles on the right:  Muscle      Dose  Biceps       75 U (+25)  FDS           100 U (+50)   FDP           75 U, (+25)  FCU           25 U,   FCR           25 U,   FPB           25 U,   MED GC    25 U  LAT CG      25 U   TP             50 U     Lot No for sample: [id number]  Exp: 11/2025    Assessment/Plan   This is a 47 yo originally  right handed AAF with hx of HTN and LICA dissection in 2021 resulting in aphasia and right hemiparesis who is referred for advanced spasticity management. Main concern is improving ROM of the RUE and improve walking. She reports painful isometric spasms on the right side at nighttime. Exam positive for severe spasticity of the right hemibody with dystonic flexion of the right elbow, wrist, and fingers along with some extension of the right knee. No tonic spasms, clonus, myoclonus, or tremor on exam. She has had no response to oral baclofen so far. She can be a good candidate for a higher dose of baclofen and targeted botulinum toxin injection to the following muscles on the right: biceps 50 U, FDS 50 U, FDP 50 U, FCU 25 U, FCR 25 U, FPB 25 U, MED GC 25, LAT CG 25, TP 25.  ITB not indicated due to maximum goals in the UE and lack of injectable trial.      11/16/2023: The increased dose of baclofen helped without side effects. Here for first botox injections. Had pain with injections.     4/11/2024:Botox helped but lasted only one month.We  increased to 300 units today,We added 25 units to FDS and 25 units to FDP.    9/12/2024: Botox helped and would like to do the same.    1/23/2024:  Botox helped but wore off after 2 weeks compared to previously when it lasted for 1 month. Denies SE. Would like to try higher dose. 100 U sample was used to add 50 U to FDS, 25 U to FDP and 25 U to biceps. Total injected was 400 U (300 U billed, 100 U sample). Will request approval for 100 U more.    PLAN:  You were injected with a total of 400 units of botox. You tolerated the procedure well. The effect of botox usually kicks in after 3 to 5 days and lasts for three months. Please call office after about a week to report how you are doing. Follow up after three months for repeat injections.      Please continue the same dose of baclofen. Refill provided.     Please continue physical and occupational therapy.      Please continue daily  stretching.      Thank you visiting the clinic today        The impression and plan were discussed with the patient and family (if present) in details and all questions answered.      Neurology Botulinum Injection

## 2025-03-12 ENCOUNTER — TELEPHONE (OUTPATIENT)
Dept: NEUROLOGY | Facility: CLINIC | Age: 50
End: 2025-03-12
Payer: MEDICARE

## 2025-06-19 ENCOUNTER — APPOINTMENT (OUTPATIENT)
Dept: NEUROLOGY | Facility: CLINIC | Age: 50
End: 2025-06-19
Payer: MEDICARE

## 2025-06-26 ENCOUNTER — APPOINTMENT (OUTPATIENT)
Dept: NEUROLOGY | Facility: CLINIC | Age: 50
End: 2025-06-26
Payer: MEDICARE

## 2025-07-21 ENCOUNTER — OFFICE VISIT (OUTPATIENT)
Dept: CARDIOLOGY | Facility: HOSPITAL | Age: 50
End: 2025-07-21
Payer: MEDICARE

## 2025-07-21 VITALS
SYSTOLIC BLOOD PRESSURE: 97 MMHG | DIASTOLIC BLOOD PRESSURE: 67 MMHG | HEIGHT: 62 IN | WEIGHT: 140 LBS | BODY MASS INDEX: 25.76 KG/M2 | HEART RATE: 75 BPM | OXYGEN SATURATION: 99 %

## 2025-07-21 DIAGNOSIS — I77.71 BILATERAL CAROTID ARTERY DISSECTION (MULTI): Primary | ICD-10-CM

## 2025-07-21 DIAGNOSIS — M35.9 CONNECTIVE TISSUE DISEASE, UNDIFFERENTIATED (MULTI): ICD-10-CM

## 2025-07-21 DIAGNOSIS — I10 PRIMARY HYPERTENSION: ICD-10-CM

## 2025-07-21 PROCEDURE — 99213 OFFICE O/P EST LOW 20 MIN: CPT | Performed by: INTERNAL MEDICINE

## 2025-07-21 PROCEDURE — 3078F DIAST BP <80 MM HG: CPT | Performed by: INTERNAL MEDICINE

## 2025-07-21 PROCEDURE — 1036F TOBACCO NON-USER: CPT | Performed by: INTERNAL MEDICINE

## 2025-07-21 PROCEDURE — 3008F BODY MASS INDEX DOCD: CPT | Performed by: INTERNAL MEDICINE

## 2025-07-21 PROCEDURE — 3074F SYST BP LT 130 MM HG: CPT | Performed by: INTERNAL MEDICINE

## 2025-07-21 RX ORDER — ERGOCALCIFEROL 1.25 MG/1
1.25 CAPSULE ORAL
COMMUNITY

## 2025-07-21 NOTE — LETTER
"July 21, 2025     Simeon Betancourt MD  [address]  [facility] [address]    Patient: Ann Marie Bradshaw   YOB: 1975   Date of Visit: 7/21/2025       Dear Dr. Simeon Betancourt MD:    Thank you for referring Ann Marie Bradshaw to me for evaluation. Below are my notes for this consultation.  If you have questions, please do not hesitate to call me. I look forward to following your patient along with you.       Sincerely,     Anushka Parham MD      CC: No Recipients  ______________________________________________________________________________________    07/21/25  1:33 PM    Vascular Medicine - FMD/Arterial Dissection Program    Ms. Bradshaw is a 50 y.o. woman seen in follow up of major left MCA territory ischemic stroke 6.2021 due to left ICA dissection with stigmata of right ICA dissection and has a left V2/V3 PSA irregularity, possible c/w prior dissection there. She does not have classical FMD; no lesions below neck, ICA ? Beading vs. Remodeling at sites of prior dissection.  She has ICA tortuosity. She has uncontrolled HTN in the past, better now.    Since I saw her in 9.2024, she has made a little more progress with speech and right sided weakness. Spasticity also better.  She is on aspirin alone and doing fine with this. BP seems fine. She did not get a CTA head/neck scheduled.    She does not have major headaches.  She never saw genetics.    Family hx: 2 daughters, 1 is a nursing student (younger).  Her brother had a fatal \"neck artery rupture\" of uncertain circumstances (he was intubated for another reason at the time).    Medical History[1]    Problem List[2]    Current Medications[3]    Allergies[4]    On examination:  Patient Vitals for the past 24 hrs:   BP Pulse SpO2 Height Weight   07/21/25 1301 97/67 75 99 % 1.575 m (5' 2\") 63.5 kg (140 lb)   No cervical bruits heard today  Marked right sided weakness but can raise upper arm  +S1, S2, RRR; no " "m/r/g  Clear lungs  Right leg weak  Speech a little more fluid in terms of her expressive aphasia    7.2024 CTA head/neck (prior review)  IMPRESSION:     No acute intracranial pathology.     No stenosis or occlusion in the head or neck.     Chronic appearing cervical ICA aneurysms and pseudoaneurysm and mild   irregularity of the left V3 segment as described above, suggestive of a   connective tissue disorder.     Arterial blood flow was measured to detect acute large vessel occlusion   by computer aided detection software: Not Performed.   Concordance between software and imaging review: Not Applicable.     11.2023 CTA c/a/p (prior review)  NO aortic or visceral branch aneurysms,  No FMD    7.2024 Labs  Cr 0.77  K 3.4  Na 138  HgB 12.5,     Assessment/Plan: 50 y.o. woman with large left hemispheric stroke in 6.2021 in context of hx of HTN and Sjogren's/MCTD. She has arteriopathy with stigmata of dissections in left ICA (acute), chronic changes right ICA, left vert. Her ICAs are tortuous. Her brother had an artery in his neck \"rupture\" with uncertain circumstances. She is doing well overall..  She has made a little more progress with neurological recovery.  BP well controlled today.  This is critical.   Continue SAPT with aspirin and her present anti HTNsive regimen of losartan and amlodipine.     Bps have been very low at medical appointments; IF they emain low could drop her amlodipine to 5 mg/day.    As a I don't see classical FMD and any beads only in dissected areas, I'd like her to see medical genetics for arteriopathy panel.    I will follow up with her on her CTA head/neck results.    Anushka Parham MD           [1]  Past Medical History:  Diagnosis Date   • Pain in right wrist 02/12/2018    Right wrist pain   • Personal history of other diseases of the female genital tract 07/09/2021    History of vaginal discharge   • Personal history of other diseases of the musculoskeletal system and connective " tissue 02/12/2018    History of fibromyalgia   [2]  Patient Active Problem List  Diagnosis   • Neurologic gait disorder   • Internal derangement of knee   • Hypertension   • History of ischemic left MCA stroke   • Hemiparesis affecting dominant side as late effect of stroke (Multi)   • Miladys's deformity   • Fibromuscular dysplasia of carotid artery   • Expressive aphasia   • Bilateral carotid artery dissection (Multi)   • Chronic back pain   • Cerebrovascular accident (CVA) (Multi)   • Connective tissue disease, undifferentiated (Multi)   • Bilateral carpal tunnel syndrome   • Apraxia following cerebral infarction   • Aphasia following cerebral infarction   • Achilles tendinosis   • Achilles tendinitis   • Spastic hemiplegia of right dominant side as late effect of cerebral infarction (Multi)   • Seizures (Multi)   • Palpitations   [3]  Current Outpatient Medications   Medication Sig Dispense Refill   • acetaminophen (Tylenol) 500 mg tablet Take by mouth.     • ALPRAZolam (Xanax) 1 mg tablet Take by mouth.     • amLODIPine (Norvasc) 10 mg tablet Take 1 tablet (10 mg) by mouth once daily.     • aspirin 81 mg EC tablet Take 1 tablet (81 mg) by mouth once daily.     • atorvastatin (Lipitor) 40 mg tablet Take 1 tablet (40 mg) by mouth once daily at bedtime.     • baclofen (Lioresal) 20 mg tablet ONE TABLET THREE TIMES A   tablet 3   • ergocalciferol (Vitamin D-2) 1250 mcg (50,000 units) capsule Take 1 capsule (1.25 mg) by mouth 1 (one) time per week.     • ferrous sulfate 325 (65 Fe) MG tablet Take by mouth twice a day.     • folic acid (Folvite) 1 mg tablet Take by mouth.     • gabapentin (Neurontin) 300 mg capsule Take 1 capsule (300 mg) by mouth 3 times a day.     • hydroCHLOROthiazide (HYDRODiuril) 25 mg tablet Take 1 tablet (25 mg) by mouth once daily.     • hydroxychloroquine (Plaquenil) 200 mg tablet Take by mouth twice a day.     • levETIRAcetam (Keppra) 1,000 mg tablet Take 1 tablet (1,000 mg) by mouth  2 times a day. 180 tablet 3   • loratadine (Claritin) 10 mg tablet Take by mouth.     • losartan (Cozaar) 50 mg tablet Take 2 tablets (100 mg) by mouth once daily.     • omeprazole OTC (PriLOSEC OTC) 20 mg EC tablet Take 1 tablet (20 mg) by mouth once daily.     • ondansetron (Zofran) 4 mg tablet Take by mouth every 8 hours if needed.     • ondansetron ODT (Zofran-ODT) 4 mg disintegrating tablet Take by mouth every 6 hours.       Current Facility-Administered Medications   Medication Dose Route Frequency Provider Last Rate Last Admin   • onabotulinumtoxinA (Botox) injection 250 Units  250 Units intramuscular Once Rex Burkett MD       • onabotulinumtoxinA (Botox) injection 300 Units  300 Units intramuscular Once Rex Burkett MD       [4]  Allergies  Allergen Reactions   • Codeine Other     chest pain   • Prochlorperazine Other     spasm of jaw - unable to open mouth        [1]  Past Medical History:  Diagnosis Date   • Pain in right wrist 02/12/2018    Right wrist pain   • Personal history of other diseases of the female genital tract 07/09/2021    History of vaginal discharge   • Personal history of other diseases of the musculoskeletal system and connective tissue 02/12/2018    History of fibromyalgia   [2]  Patient Active Problem List  Diagnosis   • Neurologic gait disorder   • Internal derangement of knee   • Hypertension   • History of ischemic left MCA stroke   • Hemiparesis affecting dominant side as late effect of stroke (Multi)   • Miladys's deformity   • Fibromuscular dysplasia of carotid artery   • Expressive aphasia   • Bilateral carotid artery dissection (Multi)   • Chronic back pain   • Cerebrovascular accident (CVA) (Multi)   • Connective tissue disease, undifferentiated (Multi)   • Bilateral carpal tunnel syndrome   • Apraxia following cerebral infarction   • Aphasia following cerebral infarction   • Achilles tendinosis   • Achilles tendinitis   • Spastic hemiplegia of right dominant side as  late effect of cerebral infarction (Multi)   • Seizures (Multi)   • Palpitations   [3]  Current Outpatient Medications   Medication Sig Dispense Refill   • acetaminophen (Tylenol) 500 mg tablet Take by mouth.     • ALPRAZolam (Xanax) 1 mg tablet Take by mouth.     • amLODIPine (Norvasc) 10 mg tablet Take 1 tablet (10 mg) by mouth once daily.     • aspirin 81 mg EC tablet Take 1 tablet (81 mg) by mouth once daily.     • atorvastatin (Lipitor) 40 mg tablet Take 1 tablet (40 mg) by mouth once daily at bedtime.     • baclofen (Lioresal) 20 mg tablet ONE TABLET THREE TIMES A   tablet 3   • ergocalciferol (Vitamin D-2) 1250 mcg (50,000 units) capsule Take 1 capsule (1.25 mg) by mouth 1 (one) time per week.     • ferrous sulfate 325 (65 Fe) MG tablet Take by mouth twice a day.     • folic acid (Folvite) 1 mg tablet Take by mouth.     • gabapentin (Neurontin) 300 mg capsule Take 1 capsule (300 mg) by mouth 3 times a day.     • hydroCHLOROthiazide (HYDRODiuril) 25 mg tablet Take 1 tablet (25 mg) by mouth once daily.     • hydroxychloroquine (Plaquenil) 200 mg tablet Take by mouth twice a day.     • levETIRAcetam (Keppra) 1,000 mg tablet Take 1 tablet (1,000 mg) by mouth 2 times a day. 180 tablet 3   • loratadine (Claritin) 10 mg tablet Take by mouth.     • losartan (Cozaar) 50 mg tablet Take 2 tablets (100 mg) by mouth once daily.     • omeprazole OTC (PriLOSEC OTC) 20 mg EC tablet Take 1 tablet (20 mg) by mouth once daily.     • ondansetron (Zofran) 4 mg tablet Take by mouth every 8 hours if needed.     • ondansetron ODT (Zofran-ODT) 4 mg disintegrating tablet Take by mouth every 6 hours.       Current Facility-Administered Medications   Medication Dose Route Frequency Provider Last Rate Last Admin   • onabotulinumtoxinA (Botox) injection 250 Units  250 Units intramuscular Once Rex Burkett MD       • onabotulinumtoxinA (Botox) injection 300 Units  300 Units intramuscular Once Rex Burkett MD        [4]  Allergies  Allergen Reactions   • Codeine Other     chest pain   • Prochlorperazine Other     spasm of jaw - unable to open mouth

## 2025-07-21 NOTE — PROGRESS NOTES
"07/21/25  1:33 PM    Vascular Medicine - FMD/Arterial Dissection Program    Ms. Bradshaw is a 50 y.o. woman seen in follow up of major left MCA territory ischemic stroke 6.2021 due to left ICA dissection with stigmata of right ICA dissection and has a left V2/V3 PSA irregularity, possible c/w prior dissection there. She does not have classical FMD; no lesions below neck, ICA ? Beading vs. Remodeling at sites of prior dissection.  She has ICA tortuosity. She has uncontrolled HTN in the past, better now.    Since I saw her in 9.2024, she has made a little more progress with speech and right sided weakness. Spasticity also better.  She is on aspirin alone and doing fine with this. BP seems fine. She did not get a CTA head/neck scheduled.    She does not have major headaches.  She never saw genetics.    Family hx: 2 daughters, 1 is a nursing student (younger).  Her brother had a fatal \"neck artery rupture\" of uncertain circumstances (he was intubated for another reason at the time).    Medical History[1]    Problem List[2]    Current Medications[3]    Allergies[4]    On examination:  Patient Vitals for the past 24 hrs:   BP Pulse SpO2 Height Weight   07/21/25 1301 97/67 75 99 % 1.575 m (5' 2\") 63.5 kg (140 lb)   No cervical bruits heard today  Marked right sided weakness but can raise upper arm  +S1, S2, RRR; no m/r/g  Clear lungs  Right leg weak  Speech a little more fluid in terms of her expressive aphasia    7.2024 CTA head/neck (prior review)  IMPRESSION:     No acute intracranial pathology.     No stenosis or occlusion in the head or neck.     Chronic appearing cervical ICA aneurysms and pseudoaneurysm and mild   irregularity of the left V3 segment as described above, suggestive of a   connective tissue disorder.     Arterial blood flow was measured to detect acute large vessel occlusion   by computer aided detection software: Not Performed.   Concordance between software and imaging review: Not Applicable. " "    11.2023 CTA c/a/p (prior review)  NO aortic or visceral branch aneurysms,  No FMD    7.2024 Labs  Cr 0.77  K 3.4  Na 138  HgB 12.5,     Assessment/Plan: 50 y.o. woman with large left hemispheric stroke in 6.2021 in context of hx of HTN and Sjogren's/MCTD. She has arteriopathy with stigmata of dissections in left ICA (acute), chronic changes right ICA, left vert. Her ICAs are tortuous. Her brother had an artery in his neck \"rupture\" with uncertain circumstances. She is doing well overall..  She has made a little more progress with neurological recovery.  BP well controlled today.  This is critical.   Continue SAPT with aspirin and her present anti HTNsive regimen of losartan and amlodipine.     Bps have been very low at medical appointments; IF they emain low could drop her amlodipine to 5 mg/day.    As a I don't see classical FMD and any beads only in dissected areas, I'd like her to see medical genetics for arteriopathy panel.    I will follow up with her on her CTA head/neck results.    Anushka Parham MD           [1]   Past Medical History:  Diagnosis Date    Pain in right wrist 02/12/2018    Right wrist pain    Personal history of other diseases of the female genital tract 07/09/2021    History of vaginal discharge    Personal history of other diseases of the musculoskeletal system and connective tissue 02/12/2018    History of fibromyalgia   [2]   Patient Active Problem List  Diagnosis    Neurologic gait disorder    Internal derangement of knee    Hypertension    History of ischemic left MCA stroke    Hemiparesis affecting dominant side as late effect of stroke (Multi)    Miladys's deformity    Fibromuscular dysplasia of carotid artery    Expressive aphasia    Bilateral carotid artery dissection (Multi)    Chronic back pain    Cerebrovascular accident (CVA) (Multi)    Connective tissue disease, undifferentiated (Multi)    Bilateral carpal tunnel syndrome    Apraxia following cerebral infarction    " Aphasia following cerebral infarction    Achilles tendinosis    Achilles tendinitis    Spastic hemiplegia of right dominant side as late effect of cerebral infarction (Multi)    Seizures (Multi)    Palpitations   [3]   Current Outpatient Medications   Medication Sig Dispense Refill    acetaminophen (Tylenol) 500 mg tablet Take by mouth.      ALPRAZolam (Xanax) 1 mg tablet Take by mouth.      amLODIPine (Norvasc) 10 mg tablet Take 1 tablet (10 mg) by mouth once daily.      aspirin 81 mg EC tablet Take 1 tablet (81 mg) by mouth once daily.      atorvastatin (Lipitor) 40 mg tablet Take 1 tablet (40 mg) by mouth once daily at bedtime.      baclofen (Lioresal) 20 mg tablet ONE TABLET THREE TIMES A   tablet 3    ergocalciferol (Vitamin D-2) 1250 mcg (50,000 units) capsule Take 1 capsule (1.25 mg) by mouth 1 (one) time per week.      ferrous sulfate 325 (65 Fe) MG tablet Take by mouth twice a day.      folic acid (Folvite) 1 mg tablet Take by mouth.      gabapentin (Neurontin) 300 mg capsule Take 1 capsule (300 mg) by mouth 3 times a day.      hydroCHLOROthiazide (HYDRODiuril) 25 mg tablet Take 1 tablet (25 mg) by mouth once daily.      hydroxychloroquine (Plaquenil) 200 mg tablet Take by mouth twice a day.      levETIRAcetam (Keppra) 1,000 mg tablet Take 1 tablet (1,000 mg) by mouth 2 times a day. 180 tablet 3    loratadine (Claritin) 10 mg tablet Take by mouth.      losartan (Cozaar) 50 mg tablet Take 2 tablets (100 mg) by mouth once daily.      omeprazole OTC (PriLOSEC OTC) 20 mg EC tablet Take 1 tablet (20 mg) by mouth once daily.      ondansetron (Zofran) 4 mg tablet Take by mouth every 8 hours if needed.      ondansetron ODT (Zofran-ODT) 4 mg disintegrating tablet Take by mouth every 6 hours.       Current Facility-Administered Medications   Medication Dose Route Frequency Provider Last Rate Last Admin    onabotulinumtoxinA (Botox) injection 250 Units  250 Units intramuscular Once Rex Burkett MD         onabotulinumtoxinA (Botox) injection 300 Units  300 Units intramuscular Once Rex Burkett MD       [4]   Allergies  Allergen Reactions    Codeine Other     chest pain    Prochlorperazine Other     spasm of jaw - unable to open mouth

## 2025-08-14 ENCOUNTER — HOSPITAL ENCOUNTER (OUTPATIENT)
Dept: RADIOLOGY | Facility: EXTERNAL LOCATION | Age: 50
Discharge: HOME | End: 2025-08-14

## 2025-09-25 ENCOUNTER — APPOINTMENT (OUTPATIENT)
Dept: NEUROLOGY | Facility: CLINIC | Age: 50
End: 2025-09-25
Payer: MEDICARE

## 2025-10-09 ENCOUNTER — APPOINTMENT (OUTPATIENT)
Dept: NEUROLOGY | Facility: CLINIC | Age: 50
End: 2025-10-09
Payer: MEDICARE